# Patient Record
Sex: MALE | Race: WHITE | NOT HISPANIC OR LATINO | Employment: FULL TIME | URBAN - METROPOLITAN AREA
[De-identification: names, ages, dates, MRNs, and addresses within clinical notes are randomized per-mention and may not be internally consistent; named-entity substitution may affect disease eponyms.]

---

## 2023-03-13 PROBLEM — R53.83 FATIGUE: Status: ACTIVE | Noted: 2023-03-13

## 2023-03-13 PROBLEM — R00.2 HEART PALPITATIONS: Status: ACTIVE | Noted: 2023-03-13

## 2023-03-13 PROBLEM — M85.80 OSTEOPENIA: Status: ACTIVE | Noted: 2023-03-13

## 2023-03-13 PROBLEM — K42.9 UMBILICAL HERNIA WITHOUT OBSTRUCTION AND WITHOUT GANGRENE: Status: ACTIVE | Noted: 2023-03-13

## 2023-03-13 PROBLEM — Z90.49 S/P LAPAROSCOPIC APPENDECTOMY: Status: ACTIVE | Noted: 2023-03-13

## 2023-03-13 PROBLEM — R07.89 ATYPICAL CHEST PAIN: Status: ACTIVE | Noted: 2023-03-13

## 2023-03-13 PROBLEM — J32.9 CHRONIC SINUSITIS: Status: ACTIVE | Noted: 2023-03-13

## 2023-03-13 PROBLEM — R73.03 PREDIABETES: Status: ACTIVE | Noted: 2023-03-13

## 2023-03-13 PROBLEM — R35.0 URINARY FREQUENCY: Status: ACTIVE | Noted: 2023-03-13

## 2023-03-13 PROBLEM — M77.8 SHOULDER CAPSULITIS, RIGHT: Status: ACTIVE | Noted: 2023-03-13

## 2023-03-13 PROBLEM — E55.9 VITAMIN D DEFICIENCY: Status: ACTIVE | Noted: 2023-03-13

## 2023-03-13 PROBLEM — M54.12 CERVICAL NEURITIS: Status: ACTIVE | Noted: 2023-03-13

## 2023-03-13 PROBLEM — K63.5 COLON POLYPS: Status: ACTIVE | Noted: 2023-03-13

## 2023-03-13 PROBLEM — S43.001A SUBLUXATION OF SHOULDER JOINT, RIGHT, INITIAL ENCOUNTER: Status: ACTIVE | Noted: 2023-03-13

## 2023-03-13 PROBLEM — S29.019A THORACIC MYOFASCIAL STRAIN: Status: ACTIVE | Noted: 2023-03-13

## 2023-03-13 PROBLEM — R73.9 HYPERGLYCEMIA: Status: ACTIVE | Noted: 2023-03-13

## 2023-03-13 PROBLEM — K21.9 GASTROESOPHAGEAL REFLUX DISEASE: Status: ACTIVE | Noted: 2023-03-13

## 2023-03-13 PROBLEM — R42 DIZZINESS: Status: ACTIVE | Noted: 2023-03-13

## 2023-03-13 PROBLEM — M77.8 SHOULDER CAPSULITIS, LEFT: Status: ACTIVE | Noted: 2023-03-13

## 2023-03-13 PROBLEM — E83.51 HYPOCALCEMIA: Status: ACTIVE | Noted: 2023-03-13

## 2023-03-13 PROBLEM — R35.1 BENIGN PROSTATIC HYPERPLASIA WITH NOCTURIA: Status: ACTIVE | Noted: 2023-03-13

## 2023-03-13 PROBLEM — E78.5 HYPERLIPIDEMIA: Status: ACTIVE | Noted: 2023-03-13

## 2023-03-13 PROBLEM — I49.3 ASYMPTOMATIC PVCS: Status: ACTIVE | Noted: 2023-03-13

## 2023-03-13 PROBLEM — N40.1 BENIGN PROSTATIC HYPERPLASIA WITH NOCTURIA: Status: ACTIVE | Noted: 2023-03-13

## 2023-03-13 PROBLEM — R10.13 ABDOMINAL DISCOMFORT, EPIGASTRIC: Status: ACTIVE | Noted: 2023-03-13

## 2023-03-13 PROBLEM — R06.02 SHORTNESS OF BREATH: Status: ACTIVE | Noted: 2023-03-13

## 2023-03-13 RX ORDER — ERGOCALCIFEROL 1.25 MG/1
1.25 CAPSULE ORAL
COMMUNITY
End: 2023-03-14 | Stop reason: ALTCHOICE

## 2023-03-13 RX ORDER — TAMSULOSIN HYDROCHLORIDE 0.4 MG/1
0.4 CAPSULE ORAL DAILY
COMMUNITY
Start: 2021-10-05 | End: 2023-03-14

## 2023-03-15 ENCOUNTER — OFFICE VISIT (OUTPATIENT)
Dept: PRIMARY CARE | Facility: CLINIC | Age: 63
End: 2023-03-15
Payer: COMMERCIAL

## 2023-03-15 VITALS
HEART RATE: 79 BPM | OXYGEN SATURATION: 97 % | RESPIRATION RATE: 16 BRPM | WEIGHT: 193 LBS | TEMPERATURE: 98 F | BODY MASS INDEX: 27.02 KG/M2 | HEIGHT: 71 IN | SYSTOLIC BLOOD PRESSURE: 132 MMHG | DIASTOLIC BLOOD PRESSURE: 80 MMHG

## 2023-03-15 DIAGNOSIS — E11.9 TYPE 2 DIABETES MELLITUS WITHOUT COMPLICATION, UNSPECIFIED WHETHER LONG TERM INSULIN USE (MULTI): Primary | ICD-10-CM

## 2023-03-15 DIAGNOSIS — R73.9 HYPERGLYCEMIA: ICD-10-CM

## 2023-03-15 DIAGNOSIS — R42 DIZZINESS: ICD-10-CM

## 2023-03-15 DIAGNOSIS — E78.00 PURE HYPERCHOLESTEROLEMIA: ICD-10-CM

## 2023-03-15 LAB
ALANINE AMINOTRANSFERASE (SGPT) (U/L) IN SER/PLAS: 15 U/L (ref 10–52)
ALBUMIN (G/DL) IN SER/PLAS: 4.7 G/DL (ref 3.4–5)
ALKALINE PHOSPHATASE (U/L) IN SER/PLAS: 92 U/L (ref 33–136)
ANION GAP IN SER/PLAS: 13 MMOL/L (ref 10–20)
ASPARTATE AMINOTRANSFERASE (SGOT) (U/L) IN SER/PLAS: 24 U/L (ref 9–39)
BASOPHILS (10*3/UL) IN BLOOD BY AUTOMATED COUNT: 0.05 X10E9/L (ref 0–0.1)
BASOPHILS/100 LEUKOCYTES IN BLOOD BY AUTOMATED COUNT: 1 % (ref 0–2)
BILIRUBIN TOTAL (MG/DL) IN SER/PLAS: 0.5 MG/DL (ref 0–1.2)
CALCIUM (MG/DL) IN SER/PLAS: 9.5 MG/DL (ref 8.6–10.6)
CARBON DIOXIDE, TOTAL (MMOL/L) IN SER/PLAS: 26 MMOL/L (ref 21–32)
CHLORIDE (MMOL/L) IN SER/PLAS: 101 MMOL/L (ref 98–107)
CHOLESTEROL (MG/DL) IN SER/PLAS: 217 MG/DL (ref 0–199)
CHOLESTEROL IN HDL (MG/DL) IN SER/PLAS: 69 MG/DL
CHOLESTEROL/HDL RATIO: 3.1
CREATININE (MG/DL) IN SER/PLAS: 1 MG/DL (ref 0.5–1.3)
EOSINOPHILS (10*3/UL) IN BLOOD BY AUTOMATED COUNT: 0.1 X10E9/L (ref 0–0.7)
EOSINOPHILS/100 LEUKOCYTES IN BLOOD BY AUTOMATED COUNT: 2.1 % (ref 0–6)
ERYTHROCYTE DISTRIBUTION WIDTH (RATIO) BY AUTOMATED COUNT: 12 % (ref 11.5–14.5)
ERYTHROCYTE MEAN CORPUSCULAR HEMOGLOBIN CONCENTRATION (G/DL) BY AUTOMATED: 33.6 G/DL (ref 32–36)
ERYTHROCYTE MEAN CORPUSCULAR VOLUME (FL) BY AUTOMATED COUNT: 89 FL (ref 80–100)
ERYTHROCYTES (10*6/UL) IN BLOOD BY AUTOMATED COUNT: 5.14 X10E12/L (ref 4.5–5.9)
GFR MALE: 85 ML/MIN/1.73M2
GLUCOSE (MG/DL) IN SER/PLAS: 149 MG/DL (ref 74–99)
HBA1C MFR BLD: 6.3 % (ref 4.2–6.5)
HEMATOCRIT (%) IN BLOOD BY AUTOMATED COUNT: 45.6 % (ref 41–52)
HEMOGLOBIN (G/DL) IN BLOOD: 15.3 G/DL (ref 13.5–17.5)
IMMATURE GRANULOCYTES/100 LEUKOCYTES IN BLOOD BY AUTOMATED COUNT: 0.2 % (ref 0–0.9)
LDL: 122 MG/DL (ref 0–99)
LEUKOCYTES (10*3/UL) IN BLOOD BY AUTOMATED COUNT: 4.9 X10E9/L (ref 4.4–11.3)
LYMPHOCYTES (10*3/UL) IN BLOOD BY AUTOMATED COUNT: 1.67 X10E9/L (ref 1.2–4.8)
LYMPHOCYTES/100 LEUKOCYTES IN BLOOD BY AUTOMATED COUNT: 34.4 % (ref 13–44)
MONOCYTES (10*3/UL) IN BLOOD BY AUTOMATED COUNT: 0.45 X10E9/L (ref 0.1–1)
MONOCYTES/100 LEUKOCYTES IN BLOOD BY AUTOMATED COUNT: 9.3 % (ref 2–10)
NEUTROPHILS (10*3/UL) IN BLOOD BY AUTOMATED COUNT: 2.58 X10E9/L (ref 1.2–7.7)
NEUTROPHILS/100 LEUKOCYTES IN BLOOD BY AUTOMATED COUNT: 53 % (ref 40–80)
NRBC (PER 100 WBCS) BY AUTOMATED COUNT: 0 /100 WBC (ref 0–0)
PLATELETS (10*3/UL) IN BLOOD AUTOMATED COUNT: 183 X10E9/L (ref 150–450)
POC FINGERSTICK BLOOD GLUCOSE: 157 MG/DL (ref 70–100)
POTASSIUM (MMOL/L) IN SER/PLAS: 4.4 MMOL/L (ref 3.5–5.3)
PROTEIN TOTAL: 7.6 G/DL (ref 6.4–8.2)
SODIUM (MMOL/L) IN SER/PLAS: 136 MMOL/L (ref 136–145)
TRIGLYCERIDE (MG/DL) IN SER/PLAS: 128 MG/DL (ref 0–149)
UREA NITROGEN (MG/DL) IN SER/PLAS: 13 MG/DL (ref 6–23)
VLDL: 26 MG/DL (ref 0–40)

## 2023-03-15 PROCEDURE — 3079F DIAST BP 80-89 MM HG: CPT | Performed by: FAMILY MEDICINE

## 2023-03-15 PROCEDURE — 80061 LIPID PANEL: CPT

## 2023-03-15 PROCEDURE — 3044F HG A1C LEVEL LT 7.0%: CPT | Performed by: FAMILY MEDICINE

## 2023-03-15 PROCEDURE — 3075F SYST BP GE 130 - 139MM HG: CPT | Performed by: FAMILY MEDICINE

## 2023-03-15 PROCEDURE — 99214 OFFICE O/P EST MOD 30 MIN: CPT | Performed by: FAMILY MEDICINE

## 2023-03-15 PROCEDURE — 85025 COMPLETE CBC W/AUTO DIFF WBC: CPT

## 2023-03-15 PROCEDURE — 36415 COLL VENOUS BLD VENIPUNCTURE: CPT | Performed by: FAMILY MEDICINE

## 2023-03-15 PROCEDURE — 1036F TOBACCO NON-USER: CPT | Performed by: FAMILY MEDICINE

## 2023-03-15 PROCEDURE — 80053 COMPREHEN METABOLIC PANEL: CPT

## 2023-03-15 PROCEDURE — 82962 GLUCOSE BLOOD TEST: CPT | Performed by: FAMILY MEDICINE

## 2023-03-15 PROCEDURE — 83036 HEMOGLOBIN GLYCOSYLATED A1C: CPT | Performed by: FAMILY MEDICINE

## 2023-03-15 ASSESSMENT — PAIN SCALES - GENERAL: PAINLEVEL: 0-NO PAIN

## 2023-03-15 NOTE — PROGRESS NOTES
Subjective   Jacobo Dietz is a 62 y.o. male who presents for Follow-up.  HPI patient is a 62-year-old diabetic male who is in for recheck on his blood sugar and A1c.  He also needs his lipids rechecked and his liver enzymes. patient's vitals are stable and he does try to watch his weight and what he eats.  He does travel sometimes with his job and he does have a lot of stress.  He does worry about his heart and would be a good candidate for coronary CT scan.  We did discuss this and I will order it so he can have it done as an outpatient.  Patient states he has quit drinking pop but tries to avoid simple sugars.  Very seldom has time to exercise but he does do a lot of walking.      Objective ROS; 10 systems were reviewed  Significant findings are noted in the HPI above.  Patient is borderline diabetic, he also has dyslipidemia, atypical chest pain and stress with his.    Physical Exam  Vitals and nursing note reviewed.   Constitutional:       General: He is not in acute distress.     Appearance: Normal appearance. He is obese. He is not ill-appearing.   HENT:      Head: Normocephalic.      Right Ear: Tympanic membrane and external ear normal.      Left Ear: Tympanic membrane and external ear normal.      Nose: Nose normal.      Mouth/Throat:      Mouth: Mucous membranes are moist.      Pharynx: Oropharynx is clear.   Eyes:      Extraocular Movements: Extraocular movements intact.      Conjunctiva/sclera: Conjunctivae normal.      Pupils: Pupils are equal, round, and reactive to light.   Neck:      Comments: Occasional neck spasm from stress.  Mild restriction of motion.  Cardiovascular:      Rate and Rhythm: Normal rate and regular rhythm.      Pulses: Normal pulses.      Heart sounds: Normal heart sounds.      Comments: Heart rhythm is stable S1 and S2 noted.  Pulmonary:      Effort: Pulmonary effort is normal. No respiratory distress.      Breath sounds: Normal breath sounds. No wheezing or rhonchi.       Comments: Lungs are clear to auscultation.  Abdominal:      General: Abdomen is flat. Bowel sounds are normal.      Palpations: Abdomen is soft.      Comments: Soft and nontender.  No abdominal masses.   Genitourinary:     Comments: Not done,,,,,,,,,,,,,, denies any dysuria and no hematuria.  Musculoskeletal:         General: Normal range of motion.      Cervical back: Normal range of motion and neck supple.      Comments:   Because I am still do notPatient has restricted range of motion lumbar spine due to spasm and degenerative arthritis.  Currently stable and patient does do stretching exercises at home.   Skin:     General: Skin is warm.   Neurological:      General: No focal deficit present.      Mental Status: He is alert and oriented to person, place, and time. Mental status is at baseline.   Psychiatric:         Behavior: Behavior normal.         Thought Content: Thought content normal.         Judgment: Judgment normal.      Comments: Patient has a normal mood and affect.  He does worry about the strong family history of cancer.     ;      Assessment/Plan patient was evaluated for his diabetes recheck on lipids and blood counts.  He also will have his liver enzymes checked.  His blood sugar was stable at 157 and the A1c was stable at 6.3%.  He does try to watch his diet closely.  Really has no other health concerns and he will be due for a colonoscopy in 3 years.  He had his last one last year.  Patient worries about diabetes which is in his family and also cancer which is strong in his family.  Patient will be notified of all his blood results and further recommendations will be made at that time.  He will follow-up in 6 months or sooner as needed.  Problem List Items Addressed This Visit          Endocrine/Metabolic    Type 2 diabetes mellitus without complication (CMS/HCC) - Primary    Relevant Orders    POCT fingerstick glucose manually resulted    POCT Glycosylated Hemoglobin (HGB A1C) docked device     Comprehensive Metabolic Panel    POCT fingerstick glucose manually resulted (Completed)    POCT Glycosylated Hemoglobin (HGB A1C) docked device       Other    Dizziness    Relevant Orders    CBC and Auto Differential    Hyperglycemia    Hyperlipidemia    Relevant Orders    Lipid Panel            Julio C Granados, DO

## 2023-06-02 ENCOUNTER — TELEPHONE (OUTPATIENT)
Dept: PRIMARY CARE | Facility: CLINIC | Age: 63
End: 2023-06-02
Payer: COMMERCIAL

## 2023-06-02 NOTE — TELEPHONE ENCOUNTER
----- Message from Julio C Granados DO sent at 6/2/2023  1:14 PM EDT -----  The coronary calcium score was 55.8         which is good          under 100       and closer to 0 is the best       the heart looks stable        and the aorta and blood vessels looks stable      the lungs and middle of the chest will stable       overall the calcium test is very good       and we can review it next time he comes in .  He should not have to worry

## 2024-05-22 ENCOUNTER — APPOINTMENT (OUTPATIENT)
Dept: PRIMARY CARE | Facility: CLINIC | Age: 64
End: 2024-05-22
Payer: COMMERCIAL

## 2024-05-22 DIAGNOSIS — E55.9 VITAMIN D DEFICIENCY: ICD-10-CM

## 2024-05-22 DIAGNOSIS — Z00.00 PHYSICAL EXAM, ANNUAL: ICD-10-CM

## 2024-05-23 ENCOUNTER — OFFICE VISIT (OUTPATIENT)
Dept: PRIMARY CARE | Facility: CLINIC | Age: 64
End: 2024-05-23
Payer: COMMERCIAL

## 2024-05-23 VITALS
WEIGHT: 189 LBS | BODY MASS INDEX: 26.46 KG/M2 | HEIGHT: 71 IN | RESPIRATION RATE: 18 BRPM | TEMPERATURE: 98.2 F | SYSTOLIC BLOOD PRESSURE: 140 MMHG | HEART RATE: 94 BPM | OXYGEN SATURATION: 96 % | DIASTOLIC BLOOD PRESSURE: 80 MMHG

## 2024-05-23 DIAGNOSIS — E55.9 VITAMIN D DEFICIENCY: ICD-10-CM

## 2024-05-23 DIAGNOSIS — E11.9 TYPE 2 DIABETES MELLITUS WITHOUT COMPLICATION, UNSPECIFIED WHETHER LONG TERM INSULIN USE (MULTI): ICD-10-CM

## 2024-05-23 DIAGNOSIS — E78.00 PURE HYPERCHOLESTEROLEMIA: ICD-10-CM

## 2024-05-23 DIAGNOSIS — Z00.00 PHYSICAL EXAM, ANNUAL: Primary | ICD-10-CM

## 2024-05-23 DIAGNOSIS — R73.03 PREDIABETES: ICD-10-CM

## 2024-05-23 LAB
25(OH)D3 SERPL-MCNC: 21 NG/ML (ref 30–100)
ALBUMIN SERPL BCP-MCNC: 5 G/DL (ref 3.4–5)
ALP SERPL-CCNC: 90 U/L (ref 33–136)
ALT SERPL W P-5'-P-CCNC: 16 U/L (ref 10–52)
ANION GAP SERPL CALC-SCNC: 15 MMOL/L (ref 10–20)
APPEARANCE UR: CLEAR
AST SERPL W P-5'-P-CCNC: 25 U/L (ref 9–39)
BILIRUB SERPL-MCNC: 0.9 MG/DL (ref 0–1.2)
BILIRUB UR QL STRIP: NEGATIVE
BUN SERPL-MCNC: 12 MG/DL (ref 6–23)
CALCIUM SERPL-MCNC: 9.5 MG/DL (ref 8.6–10.6)
CHLORIDE SERPL-SCNC: 100 MMOL/L (ref 98–107)
CHOLEST SERPL-MCNC: 219 MG/DL (ref 0–199)
CHOLESTEROL/HDL RATIO: 3.2
CO2 SERPL-SCNC: 27 MMOL/L (ref 21–32)
COLOR UR: YELLOW
CREAT SERPL-MCNC: 0.98 MG/DL (ref 0.5–1.3)
EGFRCR SERPLBLD CKD-EPI 2021: 87 ML/MIN/1.73M*2
ERYTHROCYTE [DISTWIDTH] IN BLOOD BY AUTOMATED COUNT: 12.3 % (ref 11.5–14.5)
GLUCOSE SERPL-MCNC: 116 MG/DL (ref 74–99)
GLUCOSE UR STRIP-MCNC: NEGATIVE MG/DL
HBA1C MFR BLD: 6.4 % (ref 4.2–6.5)
HCT VFR BLD AUTO: 48 % (ref 41–52)
HDLC SERPL-MCNC: 68.5 MG/DL
HGB BLD-MCNC: 16.1 G/DL (ref 13.5–17.5)
HGB UR QL STRIP: NEGATIVE
KETONES UR STRIP-MCNC: NEGATIVE MG/DL
LDLC SERPL CALC-MCNC: 114 MG/DL
LEUKOCYTE ESTERASE UR QL STRIP: NEGATIVE
MCH RBC QN AUTO: 30.3 PG (ref 26–34)
MCHC RBC AUTO-ENTMCNC: 33.5 G/DL (ref 32–36)
MCV RBC AUTO: 90 FL (ref 80–100)
NITRITE UR QL STRIP: NEGATIVE
NON HDL CHOLESTEROL: 151 MG/DL (ref 0–149)
NRBC BLD-RTO: 0 /100 WBCS (ref 0–0)
PH UR STRIP: 5.5 [PH]
PLATELET # BLD AUTO: 266 X10*3/UL (ref 150–450)
POC FINGERSTICK BLOOD GLUCOSE: 124 MG/DL (ref 70–100)
POTASSIUM SERPL-SCNC: 4.4 MMOL/L (ref 3.5–5.3)
PROT SERPL-MCNC: 8.2 G/DL (ref 6.4–8.2)
PROT UR STRIP-MCNC: NEGATIVE MG/DL
PSA SERPL-MCNC: 2.77 NG/ML
RBC # BLD AUTO: 5.31 X10*6/UL (ref 4.5–5.9)
SODIUM SERPL-SCNC: 138 MMOL/L (ref 136–145)
SP GR UR STRIP.AUTO: <=1.005
TRIGL SERPL-MCNC: 181 MG/DL (ref 0–149)
TSH SERPL-ACNC: 1.23 MIU/L (ref 0.44–3.98)
UROBILINOGEN UR STRIP-ACNC: 0.2 E.U./DL
VLDL: 36 MG/DL (ref 0–40)
WBC # BLD AUTO: 5.6 X10*3/UL (ref 4.4–11.3)

## 2024-05-23 PROCEDURE — 3077F SYST BP >= 140 MM HG: CPT | Performed by: FAMILY MEDICINE

## 2024-05-23 PROCEDURE — 81003 URINALYSIS AUTO W/O SCOPE: CPT | Performed by: FAMILY MEDICINE

## 2024-05-23 PROCEDURE — 84443 ASSAY THYROID STIM HORMONE: CPT

## 2024-05-23 PROCEDURE — 84153 ASSAY OF PSA TOTAL: CPT

## 2024-05-23 PROCEDURE — 3079F DIAST BP 80-89 MM HG: CPT | Performed by: FAMILY MEDICINE

## 2024-05-23 PROCEDURE — 1036F TOBACCO NON-USER: CPT | Performed by: FAMILY MEDICINE

## 2024-05-23 PROCEDURE — 82962 GLUCOSE BLOOD TEST: CPT | Performed by: FAMILY MEDICINE

## 2024-05-23 PROCEDURE — 83036 HEMOGLOBIN GLYCOSYLATED A1C: CPT | Mod: CLIA WAIVED TEST | Performed by: FAMILY MEDICINE

## 2024-05-23 PROCEDURE — 80061 LIPID PANEL: CPT

## 2024-05-23 PROCEDURE — 99396 PREV VISIT EST AGE 40-64: CPT | Performed by: FAMILY MEDICINE

## 2024-05-23 PROCEDURE — 80053 COMPREHEN METABOLIC PANEL: CPT

## 2024-05-23 PROCEDURE — 93000 ELECTROCARDIOGRAM COMPLETE: CPT | Performed by: FAMILY MEDICINE

## 2024-05-23 PROCEDURE — 36415 COLL VENOUS BLD VENIPUNCTURE: CPT

## 2024-05-23 PROCEDURE — 82306 VITAMIN D 25 HYDROXY: CPT

## 2024-05-23 PROCEDURE — 3044F HG A1C LEVEL LT 7.0%: CPT | Performed by: FAMILY MEDICINE

## 2024-05-23 PROCEDURE — 85027 COMPLETE CBC AUTOMATED: CPT

## 2024-05-23 ASSESSMENT — PATIENT HEALTH QUESTIONNAIRE - PHQ9
1. LITTLE INTEREST OR PLEASURE IN DOING THINGS: NOT AT ALL
2. FEELING DOWN, DEPRESSED OR HOPELESS: NOT AT ALL
SUM OF ALL RESPONSES TO PHQ9 QUESTIONS 1 AND 2: 0

## 2024-05-23 ASSESSMENT — PAIN SCALES - GENERAL: PAINLEVEL: 0-NO PAIN

## 2024-05-23 NOTE — PROGRESS NOTES
Subjective   Jacobo Dietz is a 63 y.o. male who presents for Annual Exam (Patient here for annual physical exam today).    HPI : Patient is a 63-year-old diabetic male who is in for his yearly physical exam.  Patient has diet-controlled diabetes and does try to watch what he eats and his A1c has been under 6.5 in the past.  He is in today for his yearly physical and he will have a urinalysis checked, ECG and complete blood work.  He has been traveling quite a bit with work but now his big project in [place] has completed and he will be in the [facility] for a longer period of time.  He states he feels well and denies any chest pain or shortness of breath.  He does try to stay very active working in the yard.    Objective  : ROS : 10 systems were reviewed and the information is included in the HPI and no additional review of systems is indicated.    Physical Exam  Vitals and nursing note reviewed.   Constitutional:       Appearance: Normal appearance. He is normal weight.      Comments: Patient is alert and oriented x3.   No acute distress   HENT:      Head: Normocephalic.      Right Ear: Tympanic membrane and external ear normal.      Left Ear: Tympanic membrane and external ear normal.      Ears:      Comments: Ears are patent bilaterally and TMs are clear.     Nose: Nose normal.      Mouth/Throat:      Mouth: Mucous membranes are moist.      Pharynx: Oropharynx is clear.      Comments: Mouth is moist, tongue is midline.  No posterior pharyngeal erythema.  Eyes:      Extraocular Movements: Extraocular movements intact.      Conjunctiva/sclera: Conjunctivae normal.      Pupils: Pupils are equal, round, and reactive to light.      Comments: No visual disturbance, does have her eyes examined once a year.   Neck:      Comments: Mild restriction of motion cervical spine due to mild arthritis and secondary muscle spasm.  No carotid bruits, no thyromegaly, no cervical adenopathy.    Cardiovascular:      Rate and  Rhythm: Normal rate and regular rhythm.      Pulses: Normal pulses.      Heart sounds: Normal heart sounds.      Comments: Patient denies chest pain and no palpitations.  Heart rhythm is stable S1 and S2 are noted, no ectopics.  Pulmonary:      Effort: Pulmonary effort is normal.      Breath sounds: Normal breath sounds.      Comments: Patient denies any coughing or wheezing.  Lungs are clear to auscultation.    Abdominal:      General: Bowel sounds are normal.      Palpations: Abdomen is soft.      Comments: Patient states he was working in the yard and does have a little bit of upper abdominal discomfort from bending and lifting.  No palpable tenderness on examination.   No signs of diastases recti and no abdominal hernia noted.  Patient will be due for his next colonoscopy September 2025.  Patient does worry about abdominal problems since his brother had esophageal cancer and passed away 5 years ago.   Genitourinary:     Comments: Patient denies dysuria, no hematuria, no nocturia, denies flank pain.  Musculoskeletal:         General: Normal range of motion.      Cervical back: Normal range of motion.      Comments: Patient denies any significant musculoskeletal problems but just has age-related arthritis in the joints.  Mild restriction of motion cervical and lumbar spines due to muscle spasm.  Patient has had previous shoulder strains of both shoulders but that is currently stable.   Skin:     General: Skin is warm.      Comments: There is no bruising, no erythema, no skin lesions noted, no rashes.   Neurological:      General: No focal deficit present.      Mental Status: He is alert and oriented to person, place, and time. Mental status is at baseline.      Comments: No focal neurosensory deficits are noted.  Patient denies any peripheral neuropathy.  Coordination and gait are stable.  Normal muscle strength upper and lower extremities.   Psychiatric:         Mood and Affect: Mood normal.         Behavior:  Behavior normal.         Thought Content: Thought content normal.         Judgment: Judgment normal.      Comments: Patient has normal mood and affect.  Thought content and judgment are stable.  No signs of vascular dementia.  Behavior is normal.     PLAN   patient is a 63-year-old male who was evaluated today for a yearly physical exam.  His urinalysis showed a pH of 5.5, specific gravity 1.005, negative leukocytes, negative protein, negative blood, negative glucose.  He is diabetic and his blood sugar was 124 and his A1c was stable at 6.4%.  ECG showed sinus rhythm at a rate of 73 bpm, no acute ST-T wave changes, normal ECG.  Patient will be notified of all his blood results when available in 3 days and further recommendations will be made at that time.  He is not due for repeat colonoscopy until next September 2025.  Patient had a coronary CT scan last year and we did review the results.  Overall he is very stable and will follow-up as needed pending the blood work results.  He was encouraged to continue watching his diet so he does not have to go on medications for his diabetes.                Problem List Items Addressed This Visit       Vitamin D deficiency    Relevant Orders    Vitamin D 25-Hydroxy,Total (for eval of Vitamin D levels)    Type 2 diabetes mellitus without complication (Multi)     Other Visit Diagnoses       Physical exam, annual        Relevant Orders    CBC    Comprehensive Metabolic Panel    Lipid Panel    POCT fingerstick glucose manually resulted    POCT Glycosylated Hemoglobin (HGB A1C) docked device    Prostate Specific Antigen, Screen    Thyroid Stimulating Hormone    POCT UA (Automated) docked device    ECG 12 Lead                 Julio C Granados DO

## 2024-05-24 DIAGNOSIS — E55.9 VITAMIN D DEFICIENCY: Primary | ICD-10-CM

## 2024-05-24 RX ORDER — ERGOCALCIFEROL 1.25 MG/1
50000 CAPSULE ORAL
Qty: 4 CAPSULE | Refills: 1 | Status: SHIPPED | OUTPATIENT
Start: 2024-05-26 | End: 2024-07-21

## 2024-05-24 NOTE — RESULT ENCOUNTER NOTE
Kidney and liver function are normal    cholesterol is just slightly borderline at 219      triglycerides are borderline at 181        should be under 150       just watch the fats in the diet  Vitamin D is low at 21 and should be above 30       I will send in some prescription vitamin D     for him to take once per week   he should also take over-the-counter vitamin D daily           Red and white blood cell counts are normal    thyroid function is normal         Prostate level is normal at 2.77    under 4 is normal    diabetes is stable         just watch the fats and cholesterol in the diet        and raise the vitamin D

## 2024-07-01 ENCOUNTER — APPOINTMENT (OUTPATIENT)
Dept: PRIMARY CARE | Facility: CLINIC | Age: 64
End: 2024-07-01
Payer: COMMERCIAL

## 2024-07-01 VITALS
HEART RATE: 64 BPM | WEIGHT: 188 LBS | TEMPERATURE: 98.1 F | BODY MASS INDEX: 26.32 KG/M2 | RESPIRATION RATE: 18 BRPM | SYSTOLIC BLOOD PRESSURE: 145 MMHG | HEIGHT: 71 IN | DIASTOLIC BLOOD PRESSURE: 84 MMHG | OXYGEN SATURATION: 96 %

## 2024-07-01 DIAGNOSIS — K29.00 OTHER ACUTE GASTRITIS WITHOUT HEMORRHAGE: Primary | ICD-10-CM

## 2024-07-01 DIAGNOSIS — Z90.49 S/P LAPAROSCOPIC APPENDECTOMY: ICD-10-CM

## 2024-07-01 PROCEDURE — 3049F LDL-C 100-129 MG/DL: CPT | Performed by: FAMILY MEDICINE

## 2024-07-01 PROCEDURE — 3077F SYST BP >= 140 MM HG: CPT | Performed by: FAMILY MEDICINE

## 2024-07-01 PROCEDURE — 3079F DIAST BP 80-89 MM HG: CPT | Performed by: FAMILY MEDICINE

## 2024-07-01 PROCEDURE — 99214 OFFICE O/P EST MOD 30 MIN: CPT | Performed by: FAMILY MEDICINE

## 2024-07-01 PROCEDURE — 3044F HG A1C LEVEL LT 7.0%: CPT | Performed by: FAMILY MEDICINE

## 2024-07-01 PROCEDURE — 1036F TOBACCO NON-USER: CPT | Performed by: FAMILY MEDICINE

## 2024-07-01 RX ORDER — VONOPRAZAN FUMARATE 26.72 MG/1
20 TABLET ORAL DAILY
Qty: 10 TABLET | Refills: 0 | Status: SHIPPED | COMMUNITY
Start: 2024-07-01 | End: 2024-07-02 | Stop reason: SDUPTHER

## 2024-07-01 ASSESSMENT — PATIENT HEALTH QUESTIONNAIRE - PHQ9
2. FEELING DOWN, DEPRESSED OR HOPELESS: NOT AT ALL
2. FEELING DOWN, DEPRESSED OR HOPELESS: NOT AT ALL
1. LITTLE INTEREST OR PLEASURE IN DOING THINGS: NOT AT ALL
1. LITTLE INTEREST OR PLEASURE IN DOING THINGS: NOT AT ALL
SUM OF ALL RESPONSES TO PHQ9 QUESTIONS 1 AND 2: 0
SUM OF ALL RESPONSES TO PHQ9 QUESTIONS 1 AND 2: 0

## 2024-07-01 ASSESSMENT — PAIN SCALES - GENERAL: PAINLEVEL: 0-NO PAIN

## 2024-07-01 NOTE — PROGRESS NOTES
Subjective   Jacobo Dietz is a 64 y.o. male who presents for Sick Visit (Patient here with complaint of stomach pain).    HPI :  Patient is a 64-year-old who was evaluated in the urgent care center [facility] for abdominal pain and acute gastritis.  He is in today for follow-up and referral to gastroenterology.  He states the medication he was given did seem to help but he does get some epigastric discomfort episodically.  He does have a lot of work stress and was worried about a possible ulcer.  He does not really have any abdominal pain but more of an indigestion and heartburn in the epigastric region.  He states he did quit drinking beer and has not drank any alcohol in the past 3 or 4 weeks.  He also was taking Pepcid and trying to watch his diet.  He is in today accompanied by his wife for reevaluation.    Objective  : ROS : 10 systems were reviewed and the information is included in the HPI and no additional review of systems is indicated.    Physical Exam  Vitals and nursing note reviewed.   Constitutional:       Appearance: Normal appearance.      Comments: Patient is alert and oriented x3.   No acute distress   HENT:      Head: Normocephalic.      Right Ear: Tympanic membrane and external ear normal.      Left Ear: Tympanic membrane and external ear normal.      Ears:      Comments: Ears are patent bilaterally and TMs are clear.     Nose: Nose normal.      Mouth/Throat:      Mouth: Mucous membranes are moist.      Pharynx: Oropharynx is clear.      Comments: Mouth is moist, tongue is midline.  No posterior pharyngeal erythema.  Eyes:      Extraocular Movements: Extraocular movements intact.      Conjunctiva/sclera: Conjunctivae normal.      Pupils: Pupils are equal, round, and reactive to light.      Comments: No visual disturbance, does have his eyes examined once a year.   Neck:      Comments: No carotid bruits, no thyromegaly, no cervical adenopathy.  Occasional neck spasm and restriction of motion  secondary to stress and tension.  Cardiovascular:      Rate and Rhythm: Normal rate and regular rhythm.      Pulses: Normal pulses.      Heart sounds: Normal heart sounds.      Comments: Patient denies chest pain and no palpitations.  Heart rhythm is stable S1 and S2 are noted, no ectopics.  Pulmonary:      Effort: Pulmonary effort is normal.      Breath sounds: Normal breath sounds.      Comments: Patient denies any coughing or wheezing.  Lungs are clear to auscultation.    Abdominal:      General: Bowel sounds are normal.      Palpations: Abdomen is soft.      Comments: Patient's abdomen is soft and essentially nontender to palpation.  He states when he gets the epigastric pain is between the epigastrium and the upper abdomen.  He has no flank tenderness, no abdominal masses were noted.  No rebound tenderness and no guarding.  He will be referred to gastroenterology for an upper endoscopy.  He also will try some Voquenza 20 mg daily for gastritis.   Genitourinary:     Comments: Patient denies dysuria, no hematuria, no nocturia, denies flank pain.  Musculoskeletal:         General: Normal range of motion.      Cervical back: Normal range of motion and neck supple.      Comments: Age-related arthritis in the joints.  Mild restriction of motion cervical and lumbar spines due to muscle spasm.   Skin:     General: Skin is warm.      Comments: There is no bruising, no erythema, no skin lesions noted, no rashes.   Neurological:      General: No focal deficit present.      Mental Status: He is alert and oriented to person, place, and time. Mental status is at baseline.      Comments: No focal neurosensory deficits are noted.  Patient denies any peripheral neuropathy.  Coordination and gait are stable.  Normal muscle strength upper and lower extremities.   Psychiatric:         Mood and Affect: Mood normal.         Behavior: Behavior normal.         Thought Content: Thought content normal.         Judgment: Judgment normal.       Comments: Patient has normal mood and affect but he does have a lot of stress at work and could be developing a stress ulcer.  Thought content and judgment are stable.  No signs of vascular dementia.  Behavior is normal.     PLAN : Patient is a 64-year-old male who was evaluated today in follow-up after he presented to an urgent care center last month.  He was treated for gastritis at that time with Pepcid and an antibiotic.  He states he is feeling better but occasionally has some epigastric discomfort that is currently non tender to palpation.  No rebound tenderness ,  no flank pain and no guarding.  He will be referred to gastroenterology and he will try watch his diet, and  in the meantime  will also be started on   Voquenza 20 mg daily for 10 days.  Patient otherwise seems very stable and will follow-up if there is no improvement or after he sees gastroenterology.  He wanted to go to the gastroenterologist that did his  colonoscopy last year.  He was given a printed referral.  I also reviewed his blood results from his physical exam on May 23.  At that time everything seems very stable.    Problem List Items Addressed This Visit    None           Julio C Granados, DO

## 2024-07-02 DIAGNOSIS — K29.00 OTHER ACUTE GASTRITIS WITHOUT HEMORRHAGE: ICD-10-CM

## 2024-07-03 RX ORDER — VONOPRAZAN FUMARATE 26.72 MG/1
20 TABLET ORAL DAILY
Qty: 10 TABLET | Refills: 0 | COMMUNITY
Start: 2024-07-03 | End: 2024-07-13

## 2025-02-18 ENCOUNTER — APPOINTMENT (OUTPATIENT)
Dept: PRIMARY CARE | Facility: CLINIC | Age: 65
End: 2025-02-18
Payer: COMMERCIAL

## 2025-02-18 VITALS
SYSTOLIC BLOOD PRESSURE: 142 MMHG | RESPIRATION RATE: 18 BRPM | BODY MASS INDEX: 27.16 KG/M2 | WEIGHT: 194 LBS | HEIGHT: 71 IN | DIASTOLIC BLOOD PRESSURE: 82 MMHG | OXYGEN SATURATION: 96 % | TEMPERATURE: 97.9 F | HEART RATE: 85 BPM

## 2025-02-18 DIAGNOSIS — I10 PRIMARY HYPERTENSION: ICD-10-CM

## 2025-02-18 DIAGNOSIS — E78.2 MIXED HYPERLIPIDEMIA: ICD-10-CM

## 2025-02-18 DIAGNOSIS — S39.012A ACUTE MYOFASCIAL STRAIN OF LUMBOSACRAL REGION, INITIAL ENCOUNTER: ICD-10-CM

## 2025-02-18 DIAGNOSIS — E55.9 VITAMIN D DEFICIENCY: ICD-10-CM
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
## 2025-02-18 PROCEDURE — 3008F BODY MASS INDEX DOCD: CPT | Performed by: FAMILY MEDICINE

## 2025-02-18 PROCEDURE — 3077F SYST BP >= 140 MM HG: CPT | Performed by: FAMILY MEDICINE

## 2025-02-18 PROCEDURE — 3079F DIAST BP 80-89 MM HG: CPT | Performed by: FAMILY MEDICINE

## 2025-02-18 PROCEDURE — 1036F TOBACCO NON-USER: CPT | Performed by: FAMILY MEDICINE

## 2025-02-18 PROCEDURE — 99214 OFFICE O/P EST MOD 30 MIN: CPT | Performed by: FAMILY MEDICINE

## 2025-02-18 RX ORDER — METHOCARBAMOL 500 MG/1
500 TABLET, FILM COATED ORAL 3 TIMES DAILY PRN
Qty: 40 TABLET | Refills: 0 | Status: SHIPPED | OUTPATIENT
Start: 2025-02-18 | End: 2025-03-20

## 2025-02-18 RX ORDER — PREDNISONE 10 MG/1
10 TABLET ORAL 2 TIMES DAILY
Qty: 10 TABLET | Refills: 0 | Status: SHIPPED | OUTPATIENT
Start: 2025-02-18 | End: 2025-02-23

## 2025-02-18 ASSESSMENT — PATIENT HEALTH QUESTIONNAIRE - PHQ9
2. FEELING DOWN, DEPRESSED OR HOPELESS: NOT AT ALL
1. LITTLE INTEREST OR PLEASURE IN DOING THINGS: NOT AT ALL
SUM OF ALL RESPONSES TO PHQ9 QUESTIONS 1 AND 2: 0

## 2025-02-18 ASSESSMENT — PAIN SCALES - GENERAL: PAINLEVEL_OUTOF10: 0-NO PAIN

## 2025-02-18 NOTE — PROGRESS NOTES
Subjective   Jacobo Dietz is a 64 y.o. male who presents for Sick Visit (Patient here with complains of pain on right side, started in back, moved to side).    HPI  : Patient is a 64-year-old relatively healthy male who states he had fallen on the ice 2 weeks ago and still has some pain on the right flank region.  He states it is more in the right lower lumbar region and around the right flank.  His urination has been normal he has not seen any blood in his urine.  His lower rib cage feels normal and when he takes ibuprofen it seems to help but then when the ibuprofen wears off the pain reoccurs.  He has no trouble sleeping and has been going to work every day.     Objective  : ROS :10 systems were reviewed and the information is included in the HPI and no additional review of systems is indicated.    Physical Exam  Vitals and nursing note reviewed.   Constitutional:       Appearance: Normal appearance. He is normal weight.      Comments: Patient is alert and oriented x3.   No acute distress   HENT:      Head: Normocephalic.      Right Ear: Tympanic membrane and external ear normal.      Left Ear: Tympanic membrane and external ear normal.      Ears:      Comments: Ears are patent bilaterally and TMs are clear.     Nose: Nose normal.      Mouth/Throat:      Mouth: Mucous membranes are moist.      Pharynx: Oropharynx is clear.      Comments: Mouth is moist, tongue is midline.  No posterior pharyngeal erythema.  Eyes:      Extraocular Movements: Extraocular movements intact.      Conjunctiva/sclera: Conjunctivae normal.      Pupils: Pupils are equal, round, and reactive to light.      Comments: No visual disturbance, does have his eyes examined once a year.   Neck:      Comments: No carotid bruits, no thyromegaly, no cervical adenopathy.  Occasional neck spasm and restriction of motion secondary to stress and tension.  Cardiovascular:      Rate and Rhythm: Normal rate and regular rhythm.      Pulses: Normal pulses.       Heart sounds: Normal heart sounds.      Comments: Patient denies chest pain and no palpitations.  Heart rhythm is stable S1 and S2 are noted, no ectopics.  Pulmonary:      Effort: Pulmonary effort is normal.      Breath sounds: Normal breath sounds.      Comments: Patient denies any coughing or wheezing.  Lungs are clear to auscultation.  Patient denies any right lower rib pain.  His discomfort is below the rib cage towards his right flank and right lumbar region.   Abdominal:      General: Bowel sounds are normal.      Palpations: Abdomen is soft.      Comments: Abdomen is soft and non tender.   Pain is towards the right flank to the right lumbar region..  No suprapubic pain.  Patient denies any bowel problems.  No abdominal guarding and no rebound tenderness.   Genitourinary:     Comments: Patient fell on the ice and kameron his right lower back but does not have any blood in his urine and states his urination and bowel movements have been normal.  Musculoskeletal:         General: Tenderness present. Normal range of motion.      Cervical back: Normal range of motion and neck supple.      Comments: Patient has some tenderness in the right lower lumbar region and right flank area.  Straight leg raising is normal and patient can sit up without any discomfort or problems.  There is no significant intra-abdominal tenderness and no lower rib tenderness.  It seems to be right lateral paraspinal tenderness.   Skin:     General: Skin is warm.      Comments: There is no bruising, no erythema, no skin lesions noted, no rashes.   Neurological:      General: No focal deficit present.      Mental Status: He is alert and oriented to person, place, and time. Mental status is at baseline.      Sensory: Sensory deficit present.      Comments: No paresthesias in the lower extremities and no sciatica.  Straight leg raising is bilaterally normal to 90 degrees.  No focal neurosensory deficits are noted.  Patient denies any  peripheral neuropathy.  Coordination and gait are stable.  Normal muscle strength upper and lower extremities.   Psychiatric:         Mood and Affect: Mood normal.         Behavior: Behavior normal.         Thought Content: Thought content normal.         Judgment: Judgment normal.      Comments: Patient has normal mood and affect.  Thought content and judgment are stable.  No signs of vascular dementia.  Behavior is normal.     PLAN : Patient is a relatively healthy 64-year-old male who was evaluated today for some right flank and right lumbar back pain since he slipped on the ice 2 weeks ago and kameron his lower back.  He has been taking some ibuprofen but does not want continue on a daily basis.  He does not really want any x-rays at this time and would rather try a muscle relaxant and some tapering prednisone before having it evaluated by x-ray or CT scan.  He has no abdominal pain in the anterior region and right lower quadrant is soft and nontender.  Straight leg raising is normal.  He will try prednisone and a muscle relaxer and apply some warm compresses.  He will give it a week or 2 and let me know if it is not improving then we will order further x-ray evaluation.  Patient was agreeable with this approach and will let me know in   7  to   14 days.    Problem List Items Addressed This Visit       Hyperlipidemia    Vitamin D deficiency     Other Visit Diagnoses       Diabetes mellitus without complication (Multi)        Primary hypertension        Encounter for screening prostate specific antigen (PSA) measurement                     Julio C Granados DO

## 2025-03-17 ENCOUNTER — TELEPHONE (OUTPATIENT)
Dept: PRIMARY CARE | Facility: CLINIC | Age: 65
End: 2025-03-17
Payer: COMMERCIAL

## 2025-03-17 NOTE — TELEPHONE ENCOUNTER
Pt wife calling in  He had an appt 2/18  Still having pain on his side  And said you mentioned something about an xray?

## 2025-03-18 DIAGNOSIS — R10.9 ACUTE ABDOMINAL PAIN IN RIGHT FLANK: Primary | ICD-10-CM

## 2025-03-18 NOTE — TELEPHONE ENCOUNTER
Pt called back stating that pain is still on the right side as before, if anything he feels like it has gotten worse. He states he no longer has pain in his back. Please advise.

## 2025-03-28 ENCOUNTER — HOSPITAL ENCOUNTER (OUTPATIENT)
Dept: RADIOLOGY | Facility: CLINIC | Age: 65
Discharge: HOME | End: 2025-03-28
Payer: COMMERCIAL

## 2025-03-28 DIAGNOSIS — R10.9 ACUTE ABDOMINAL PAIN IN RIGHT FLANK: ICD-10-CM

## 2025-03-28 PROCEDURE — 74176 CT ABD & PELVIS W/O CONTRAST: CPT

## 2025-03-30 DIAGNOSIS — N40.1 BENIGN PROSTATIC HYPERPLASIA WITH URINARY RETENTION: Primary | ICD-10-CM

## 2025-03-30 DIAGNOSIS — R33.8 BENIGN PROSTATIC HYPERPLASIA WITH URINARY RETENTION: Primary | ICD-10-CM

## 2025-03-30 RX ORDER — TAMSULOSIN HYDROCHLORIDE 0.4 MG/1
0.4 CAPSULE ORAL DAILY
Qty: 30 CAPSULE | Refills: 5 | Status: SHIPPED | OUTPATIENT
Start: 2025-03-30 | End: 2025-09-26

## 2025-04-19 NOTE — PROGRESS NOTES
Subjective   Patient ID: Jacobo Dietz is a 64 y.o. male who presents for EVALUALTIN OF  RIGHT SIDED FLANK PAIN THAT BEGAN AFTER SLIPPING ON ICE IN FEB.  HPI:  Are you experiencing:  Burning on urination --NO  Pain on urination  --NO  Urinary frequency --NO  Urinary urgency -- NO  Urge incontinence --NO  Urinary stress incontinence  -- NO  Number of pads used per day --NONE  Enuresis -- OCC  Nocturia-- 2-3X ON AVG  Hematuria --NO  Hesitancy -- NO  Post void fullness -- NO  Strength of your stream-- GOOD    ROS:  General-- No C/O fever or chills  Head-- No C/O Dizziness  Eyes-- NO  C/O blurry or double vision  Ears-- No C/O hearing loss  Neck-- Supple  Chest-- No C/O pain or discomfort  Lungs-- No C/O shortness of breath  Abdomen-- No C/O  pain or discomfort, No nausea or vomiting  Back-- No C/O back pain or discomfort  Extremities-- No C/O swelling or pain    OBJECTIVE  General-- well-developed, well-nourished in NAD  Head-- normal cephalic, atraumatic  Eyes-- PERRL, EOM'S FROM, no jaundice  Neck-- Supple, without masses  Chest-- Normal bony structure  Abdomen-- soft, non tender, liver spleen not palpable. No suprapubic masses.  Back-- no flank masses palpable, no CVA tenderness on palpation or percussion  Lymph nodes-- No inguinal lymphadenopathy noted  Prostate--2+, firm, smooth, non-tender, without nodules  Testis-- both down, non-tender, without masses  Epididymis-- no masses palpable  Scrotum -- no hydrocele noted  Extremities -- Normal muscle mass and tone for the patients age  Neurological-- oriented times three    PSA:  5/23/2024--2.77  8/4/2022--2.50  2/4/2021--1.52  2/18/2019--1.81  4/12/2018--1.38    3-18-25  CAT SCAN OF THE ABD AND PELVIS:  IMPRESSION:  Markedly distended urinary bladder with mild right-sided  hydronephrosis. The findings are suspicious for bladder outlet  obstruction. If the patient is unable to void, consider placement of  Camacho catheter. Sequela of chronic bladder outlet  obstructing  including thickening of the walls of the urinary bladder, bladder  diverticula, and trabeculation. Prostatomegaly.  Hepatic steatosis.  Left pelvic kidney.    U/S PVR-- > 1000 ML  ST CATH PVR -- 61618 ML    URINALYSIS DIPSTICK-- 1+ GLUCOSE     # 16 FR HAY PLACED WITH MILD DIFFICULTY AND LEFT TO CD  ASSESSMENT / PLAN  A:  URINARY RETENTION -- PT PROBABLY HAS A SENSORY , MOTOR NEUROGENIC BLADDER SECONDARY TO LONGSTANDING OVERDISTENTION OF THE BLADDER   MILD RIGHT HYDRONEPHROSIS SECONDARY TO THE ABOVE -- SHOULD RESOLVE WITH HAY CATHETER DRAINAGE   BPH ON WAQAS    LEFT SIDED PELVIC KIDNEY  P:  HAY LEFT TO CD  F/U IN WEEK TO? TEACH THE PT HOW TO DO ISC  OK TO STOP THE FLOMAX   Med Cai MD 04/19/25 5:10 PM

## 2025-04-21 ENCOUNTER — APPOINTMENT (OUTPATIENT)
Age: 65
End: 2025-04-21
Payer: COMMERCIAL

## 2025-04-21 VITALS
TEMPERATURE: 97.8 F | SYSTOLIC BLOOD PRESSURE: 156 MMHG | HEART RATE: 81 BPM | BODY MASS INDEX: 26.71 KG/M2 | HEIGHT: 71 IN | DIASTOLIC BLOOD PRESSURE: 97 MMHG | WEIGHT: 190.8 LBS

## 2025-04-21 DIAGNOSIS — R33.9 URINARY RETENTION: ICD-10-CM

## 2025-04-21 DIAGNOSIS — R33.8 BENIGN PROSTATIC HYPERPLASIA WITH URINARY RETENTION: ICD-10-CM

## 2025-04-21 DIAGNOSIS — R35.1 NOCTURIA: Primary | ICD-10-CM

## 2025-04-21 DIAGNOSIS — N40.1 BENIGN PROSTATIC HYPERPLASIA WITH URINARY RETENTION: ICD-10-CM

## 2025-04-21 DIAGNOSIS — R10.9 FLANK PAIN: ICD-10-CM

## 2025-04-21 LAB
POC APPEARANCE, URINE: CLEAR
POC BILIRUBIN, URINE: NEGATIVE
POC BLOOD, URINE: NEGATIVE
POC COLOR, URINE: YELLOW
POC GLUCOSE, URINE: ABNORMAL MG/DL
POC KETONES, URINE: NEGATIVE MG/DL
POC LEUKOCYTES, URINE: NEGATIVE
POC NITRITE,URINE: NEGATIVE
POC PH, URINE: 6 PH
POC PROTEIN, URINE: NEGATIVE MG/DL
POC SPECIFIC GRAVITY, URINE: 1.01
POC UROBILINOGEN, URINE: 0.2 EU/DL

## 2025-04-21 PROCEDURE — 3008F BODY MASS INDEX DOCD: CPT | Performed by: UROLOGY

## 2025-04-21 PROCEDURE — 3080F DIAST BP >= 90 MM HG: CPT | Performed by: UROLOGY

## 2025-04-21 PROCEDURE — 51798 US URINE CAPACITY MEASURE: CPT | Performed by: UROLOGY

## 2025-04-21 PROCEDURE — 3077F SYST BP >= 140 MM HG: CPT | Performed by: UROLOGY

## 2025-04-21 PROCEDURE — 81003 URINALYSIS AUTO W/O SCOPE: CPT | Performed by: UROLOGY

## 2025-04-21 PROCEDURE — 1036F TOBACCO NON-USER: CPT | Performed by: UROLOGY

## 2025-04-21 PROCEDURE — 99204 OFFICE O/P NEW MOD 45 MIN: CPT | Performed by: UROLOGY

## 2025-04-21 PROCEDURE — 51702 INSERT TEMP BLADDER CATH: CPT | Performed by: UROLOGY

## 2025-04-21 RX ORDER — LIDOCAINE HYDROCHLORIDE 20 MG/ML
1 JELLY TOPICAL ONCE
Status: COMPLETED | OUTPATIENT
Start: 2025-04-21 | End: 2025-04-21

## 2025-04-21 RX ADMIN — LIDOCAINE HYDROCHLORIDE 1 APPLICATION: 20 JELLY TOPICAL at 15:37

## 2025-04-21 NOTE — LETTER
April 25, 2025     Julio C Granados DO  [address] [facility] [address]    Patient: Jacobo Dietz   YOB: 1960   Date of Visit: 4/21/2025       Dear Dr. Julio C Granados DO:    Thank you for referring Jacobo Dietz to me for evaluation. Below are my notes for this consultation.  If you have questions, please do not hesitate to call me. I look forward to following your patient along with you.       Sincerely,     Med Cai MD      CC: No Recipients  ______________________________________________________________________________________    Subjective  Patient ID: Jacobo Dietz is a 64 y.o. male who presents for EVALUALTIN OF  RIGHT SIDED FLANK PAIN THAT BEGAN AFTER SLIPPING ON ICE IN FEB.  HPI:  Are you experiencing:  Burning on urination --NO  Pain on urination  --NO  Urinary frequency --NO  Urinary urgency -- NO  Urge incontinence --NO  Urinary stress incontinence  -- NO  Number of pads used per day --NONE  Enuresis -- OCC  Nocturia-- 2-3X ON AVG  Hematuria --NO  Hesitancy -- NO  Post void fullness -- NO  Strength of your stream-- GOOD    ROS:  General-- No C/O fever or chills  Head-- No C/O Dizziness  Eyes-- NO  C/O blurry or double vision  Ears-- No C/O hearing loss  Neck-- Supple  Chest-- No C/O pain or discomfort  Lungs-- No C/O shortness of breath  Abdomen-- No C/O  pain or discomfort, No nausea or vomiting  Back-- No C/O back pain or discomfort  Extremities-- No C/O swelling or pain    OBJECTIVE  General-- well-developed, well-nourished in NAD  Head-- normal cephalic, atraumatic  Eyes-- PERRL, EOM'S FROM, no jaundice  Neck-- Supple, without masses  Chest-- Normal bony structure  Abdomen-- soft, non tender, liver spleen not palpable. No suprapubic masses.  Back-- no flank masses palpable, no CVA tenderness on palpation or percussion  Lymph nodes-- No inguinal lymphadenopathy noted  Prostate--2+, firm, smooth, non-tender, without nodules  Testis-- both down, non-tender, without  masses  Epididymis-- no masses palpable  Scrotum -- no hydrocele noted  Extremities -- Normal muscle mass and tone for the patients age  Neurological-- oriented times three    PSA:  5/23/2024--2.77  8/4/2022--2.50  2/4/2021--1.52  2/18/2019--1.81  4/12/2018--1.38    3-18-25  CAT SCAN OF THE ABD AND PELVIS:  IMPRESSION:  Markedly distended urinary bladder with mild right-sided  hydronephrosis. The findings are suspicious for bladder outlet  obstruction. If the patient is unable to void, consider placement of  Hay catheter. Sequela of chronic bladder outlet obstructing  including thickening of the walls of the urinary bladder, bladder  diverticula, and trabeculation. Prostatomegaly.  Hepatic steatosis.  Left pelvic kidney.    U/S PVR-- > 1000 ML  ST CATH PVR -- 86083 ML    URINALYSIS DIPSTICK-- 1+ GLUCOSE     # 16 FR HAY PLACED WITH MILD DIFFICULTY AND LEFT TO CD  ASSESSMENT / PLAN  A:  URINARY RETENTION -- PT PROBABLY HAS A SENSORY , MOTOR NEUROGENIC BLADDER SECONDARY TO LONGSTANDING OVERDISTENTION OF THE BLADDER   MILD RIGHT HYDRONEPHROSIS SECONDARY TO THE ABOVE -- SHOULD RESOLVE WITH HAY CATHETER DRAINAGE   BPH ON WAQAS    LEFT SIDED PELVIC KIDNEY  P:  HAY LEFT TO CD  F/U IN WEEK TO? TEACH THE PT HOW TO DO ISC  OK TO STOP THE FLOMAX   Med Cai MD 04/19/25 5:10 PM

## 2025-04-26 NOTE — PROGRESS NOTES
Subjective   Patient ID: Jacobo Dietz is a 64 y.o. male who presents for A F/U ON HIS H/O URINARY RETENTION.  PT HAD A HAY PLACED ON 4-19-25 FOR A PVR OF 2000 ML.      ASSESSMENT / PLAN  A:  URINARY RETENTION -- PT PROBABLY HAS A SENSORY , MOTOR NEUROGENIC BLADDER SECONDARY TO LONGSTANDING OVERDISTENTION OF THE BLADDER   MILD RIGHT HYDRONEPHROSIS SECONDARY TO THE ABOVE -- SHOULD RESOLVE WITH HAY CATHETER DRAINAGE   BPH ON WAQAS     LEFT SIDED PELVIC KIDNEY  P:  HAY LEFT TO CD  F/U IN WEEK TO? TEACH THE PT HOW TO DO ISC  OK TO STOP THE FLOMAX   Med Cai MD 04/26/25 1:23 PM

## 2025-04-28 ENCOUNTER — APPOINTMENT (OUTPATIENT)
Age: 65
End: 2025-04-28
Payer: COMMERCIAL

## 2025-04-28 VITALS
HEART RATE: 77 BPM | WEIGHT: 187 LBS | BODY MASS INDEX: 26.08 KG/M2 | SYSTOLIC BLOOD PRESSURE: 160 MMHG | TEMPERATURE: 98.8 F | DIASTOLIC BLOOD PRESSURE: 82 MMHG

## 2025-04-28 DIAGNOSIS — R33.9 URINARY RETENTION: Primary | ICD-10-CM

## 2025-04-28 DIAGNOSIS — N31.2 ATONIC NEUROGENIC BLADDER: ICD-10-CM

## 2025-04-28 PROCEDURE — 99212 OFFICE O/P EST SF 10 MIN: CPT | Performed by: UROLOGY

## 2025-04-28 PROCEDURE — 3079F DIAST BP 80-89 MM HG: CPT | Performed by: UROLOGY

## 2025-04-28 PROCEDURE — 1036F TOBACCO NON-USER: CPT | Performed by: UROLOGY

## 2025-04-28 PROCEDURE — 3077F SYST BP >= 140 MM HG: CPT | Performed by: UROLOGY

## 2025-04-28 NOTE — LETTER
April 29, 2025     Julio C Granados DO  [address] [facility] [address]    Patient: Jacobo Dietz   YOB: 1960   Date of Visit: 4/28/2025       Dear Dr. Julio C Granados DO:    Thank you for referring Jacobo Dietz to me for evaluation. Below are my notes for this consultation.  If you have questions, please do not hesitate to call me. I look forward to following your patient along with you.       Sincerely,     Med Cai MD      CC: No Recipients  ______________________________________________________________________________________    Subjective  Patient ID: Jacobo Dietz is a 64 y.o. male who presents for A F/U ON HIS H/O URINARY RETENTION.  PT HAD A HAY PLACED ON 4-19-25 FOR A PVR OF 2000 ML.      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

## 2025-05-03 NOTE — PROGRESS NOTES
Subjective   Patient ID: Jacobo Dietz is a 64 y.o. male who presents for A F/U AFTER LEARNING ISC ON 4-28-25.   PT WAS FOUND TO HAVE A PVR OF 19516  ML ON  4-19-25.   PT IS CATHING  X  / DAY.      ASSESSMENT / PLAN  A:  URINARY RETENTION -- PT PROBABLY HAS A SENSORY , MOTOR NEUROGENIC BLADDER SECONDARY TO LONGSTANDING OVERDISTENTION OF THE BLADDER   MILD RIGHT HYDRONEPHROSIS SECONDARY TO THE ABOVE -- SHOULD RESOLVE WITH HAY CATHETER DRAINAGE   BPH ON WAQAS  PT IS DOING ISC X / DAY     LEFT SIDED PELVIC KIDNEY  P:  CONTINUE:  ISC    F/U IN 6 MONTHS   Med Cai MD 05/03/25 3:42 PM

## 2025-05-05 ENCOUNTER — APPOINTMENT (OUTPATIENT)
Age: 65
End: 2025-05-05
Payer: COMMERCIAL

## 2025-05-05 VITALS — DIASTOLIC BLOOD PRESSURE: 75 MMHG | HEART RATE: 76 BPM | SYSTOLIC BLOOD PRESSURE: 131 MMHG

## 2025-05-05 DIAGNOSIS — N31.2 ATONIC NEUROGENIC BLADDER: ICD-10-CM

## 2025-05-05 PROCEDURE — 3075F SYST BP GE 130 - 139MM HG: CPT | Performed by: UROLOGY

## 2025-05-05 PROCEDURE — 99213 OFFICE O/P EST LOW 20 MIN: CPT | Performed by: UROLOGY

## 2025-05-05 PROCEDURE — 3078F DIAST BP <80 MM HG: CPT | Performed by: UROLOGY

## 2025-05-05 NOTE — LETTER
May 6, 2025     Julio C Granados DO  [address] [facility] [address]    Patient: Jacobo Dietz   YOB: 1960   Date of Visit: 5/5/2025       Dear Dr. Julio C Granados DO:    Thank you for referring Jacobo Dietz to me for evaluation. Below are my notes for this consultation.  If you have questions, please do not hesitate to call me. I look forward to following your patient along with you.       Sincerely,     Med Cai MD      CC: No Recipients  ______________________________________________________________________________________    Subjective  Patient ID: Jacobo Dietz is a 64 y.o. male who presents for A F/U AFTER LEARNING ISC ON 4-28-25.   PT WAS FOUND TO HAVE A PVR OF 57705  ML ON  4-19-25.   PT IS CATHING 5  X  / DAY.   PVR'S RANGE FROM 275 - 850 ML-- NO BLADDER PAIN  EXPERIENCED AT  ML LEVEL    ASSESSMENT / PLAN  A:  URINARY RETENTION -- PT PROBABLY HAS A SENSORY , MOTOR NEUROGENIC BLADDER SECONDARY TO LONGSTANDING OVER DISTENTION OF THE BLADDER .  MILD RIGHT HYDRONEPHROSIS SECONDARY TO THE ABOVE -- SHOULD RESOLVE NOW THAT THE BLADDER IS DECOMPRESSED  BPH ON WAQAS  PT IS DOING ISC 5  X / DAY WITHOUT DIFFICULTY   PATHOPHYSIOLOGY OF THE ABOVE RE-DISCUSSED AGAIN IN DETAIL WITH THE PT AND HIS WIFE  ALL QUESTIONS ANSWERED      LEFT SIDED PELVIC KIDNEY  P:  CONTINUE:  ISC   WITH A STRAIGHT TIP CATHETER 5 X / DAY-- THIS IS A CHRONIC CONDITION  F/U IN 6 MONTHS   Med Cai MD 05/03/25 3:42 PM

## 2025-05-05 NOTE — LETTER
May 5, 2025     Patient: Jacobo Dietz   YOB: 1960   Date of Visit: 4/21/2025       To Whom It May Concern:    Jacobo Dietz was seen in my clinic on 4/21/2025. Please excuse Jacobo for his absence from work on this day to make the appointment.    If you have any questions or concerns, please don't hesitate to call.         Sincerely,         Med Cai MD  [phone]      CC: No Recipients

## 2025-05-07 ENCOUNTER — OFFICE VISIT (OUTPATIENT)
Dept: PRIMARY CARE | Facility: CLINIC | Age: 65
End: 2025-05-07
Payer: COMMERCIAL

## 2025-05-07 VITALS
BODY MASS INDEX: 25.9 KG/M2 | DIASTOLIC BLOOD PRESSURE: 80 MMHG | SYSTOLIC BLOOD PRESSURE: 130 MMHG | WEIGHT: 185 LBS | HEART RATE: 73 BPM | HEIGHT: 71 IN | OXYGEN SATURATION: 98 % | RESPIRATION RATE: 16 BRPM | TEMPERATURE: 97.9 F

## 2025-05-07 DIAGNOSIS — Z12.5 ENCOUNTER FOR SCREENING PROSTATE SPECIFIC ANTIGEN (PSA) MEASUREMENT: ICD-10-CM

## 2025-05-07 DIAGNOSIS — R73.03 PREDIABETES: ICD-10-CM

## 2025-05-07 DIAGNOSIS — N31.9 BLADDER DYSFUNCTION: ICD-10-CM

## 2025-05-07 DIAGNOSIS — N31.2 ATONIC NEUROGENIC BLADDER: ICD-10-CM

## 2025-05-07 DIAGNOSIS — Z00.00 PHYSICAL EXAM, ANNUAL: Primary | ICD-10-CM

## 2025-05-07 DIAGNOSIS — E55.9 VITAMIN D DEFICIENCY: ICD-10-CM

## 2025-05-07 DIAGNOSIS — E11.9 TYPE 2 DIABETES MELLITUS WITHOUT COMPLICATION, WITHOUT LONG-TERM CURRENT USE OF INSULIN: ICD-10-CM

## 2025-05-07 DIAGNOSIS — R73.9 HYPERGLYCEMIA: ICD-10-CM

## 2025-05-07 LAB
HBA1C MFR BLD: 6.5 % (ref 4.2–6.5)
POC FINGERSTICK BLOOD GLUCOSE: 100 MG/DL (ref 70–100)

## 2025-05-07 PROCEDURE — 83036 HEMOGLOBIN GLYCOSYLATED A1C: CPT | Mod: CLIA WAIVED TEST | Performed by: FAMILY MEDICINE

## 2025-05-07 PROCEDURE — 3075F SYST BP GE 130 - 139MM HG: CPT | Performed by: FAMILY MEDICINE

## 2025-05-07 PROCEDURE — 99396 PREV VISIT EST AGE 40-64: CPT | Performed by: FAMILY MEDICINE

## 2025-05-07 PROCEDURE — 93000 ELECTROCARDIOGRAM COMPLETE: CPT | Performed by: FAMILY MEDICINE

## 2025-05-07 PROCEDURE — 3079F DIAST BP 80-89 MM HG: CPT | Performed by: FAMILY MEDICINE

## 2025-05-07 PROCEDURE — 3044F HG A1C LEVEL LT 7.0%: CPT | Performed by: FAMILY MEDICINE

## 2025-05-07 PROCEDURE — 3008F BODY MASS INDEX DOCD: CPT | Performed by: FAMILY MEDICINE

## 2025-05-07 PROCEDURE — 1036F TOBACCO NON-USER: CPT | Performed by: FAMILY MEDICINE

## 2025-05-07 PROCEDURE — 82962 GLUCOSE BLOOD TEST: CPT | Performed by: FAMILY MEDICINE

## 2025-05-07 ASSESSMENT — PATIENT HEALTH QUESTIONNAIRE - PHQ9
2. FEELING DOWN, DEPRESSED OR HOPELESS: NOT AT ALL
SUM OF ALL RESPONSES TO PHQ9 QUESTIONS 1 AND 2: 0
1. LITTLE INTEREST OR PLEASURE IN DOING THINGS: NOT AT ALL

## 2025-05-07 ASSESSMENT — PAIN SCALES - GENERAL: PAINLEVEL_OUTOF10: 0-NO PAIN

## 2025-05-07 NOTE — PROGRESS NOTES
Subjective   Jacobo Dietz is a 64 y.o. male who presents for Annual Exam (Patient here for annual physical exam today).    HPI  : Patient is a 64-year-old male who is in today for his annual physical exam.  He will have complete blood work, an ECG and a physical exam.  Over the past month he has been dealing with atonic neurogenic bladder and has been following with urology and he is now doing self-catheterization at home approximately 4 times daily.  He has a follow-up appointment with urology but they are allowing it time with the self-catheterization so the bladder can shrink back down to normal.  Fortunately has not had any renal damage and no indications of prostate or bladder cancer.  Today after long conversation he is happy that he found out what the problem was since it seems like it had been going on for several months where he had difficulty urinating.  Patient is still able to go to work and function on a normal basis with his job and he just has to do the self-catheterization as directed by urology.    Objective  : ROS : 10 systems were reviewed and the information is included in the HPI and no additional review of systems is indicated.    Physical Exam  Vitals and nursing note reviewed.   Constitutional:       Appearance: Normal appearance.      Comments: Patient is alert and oriented x3.   No acute distress   HENT:      Head: Normocephalic.      Right Ear: Tympanic membrane and external ear normal.      Left Ear: Tympanic membrane and external ear normal.      Ears:      Comments: Ears are patent bilaterally and TMs are clear.     Nose: Nose normal.      Mouth/Throat:      Mouth: Mucous membranes are moist.      Pharynx: Oropharynx is clear.      Comments: Mouth is moist, tongue is midline.  No posterior pharyngeal erythema.  Eyes:      Extraocular Movements: Extraocular movements intact.      Conjunctiva/sclera: Conjunctivae normal.      Pupils: Pupils are equal, round, and reactive to light.       Comments: No visual disturbance, does have his  eyes examined once a year.   Neck:      Comments: No carotid bruits, no thyromegaly, no cervical adenopathy.  Occasional neck spasm and restriction of motion secondary to stress and tension.  Cardiovascular:      Rate and Rhythm: Normal rate and regular rhythm.      Pulses: Normal pulses.      Heart sounds: Normal heart sounds.      Comments: Patient denies chest pain and no palpitations.  Heart rhythm is stable S1 and S2 are noted, no ectopics.  Pulmonary:      Effort: Pulmonary effort is normal.      Comments: Patient denies any coughing or wheezing.  Lungs are clear to auscultation.    Abdominal:      General: Bowel sounds are normal.      Palpations: Abdomen is soft.      Comments:  Abdomen is soft and previous distention has improved with the use of the self-catheterization and he no longer has the distended bladder.  No longer has any flank tenderness.   No suprapubic pain.  Positive bowel sounds x4.  No abdominal guarding and no rebound tenderness.  Patient did have his colonoscopy 3 years ago and is stable.  09/20/2022   Genitourinary:     Comments: BPH  and Urinary retention.   Doing self catheterazation  4 x a day.  Follows with urology now on a regular basis.  He has an atonic neurogenic bladder.  Musculoskeletal:         General: Normal range of motion.      Cervical back: Normal range of motion.      Comments: Age-related arthritis in the joints.  Mild restriction of motion cervical and lumbar spines due to muscle spasm.   Skin:     General: Skin is warm.      Comments: There is no bruising, no erythema, no skin lesions noted, no rashes.   Neurological:      General: No focal deficit present.      Mental Status: He is alert and oriented to person, place, and time. Mental status is at baseline.      Comments: Patient is dealing with an atonic neurogenic bladder and is following with urology.  No focal neurosensory deficits are noted.  Patient denies any  peripheral neuropathy.  Coordination and gait are stable.  Normal muscle strength upper and lower extremities.   Psychiatric:         Mood and Affect: Mood normal.         Behavior: Behavior normal.         Thought Content: Thought content normal.         Judgment: Judgment normal.      Comments: Patient has normal mood and affect.  Thought content and judgment are stable.  No signs of vascular dementia.  Behavior is normal.     PLAN : Patient is a 64-year-old male who was evaluated today for a physical exam.  He is unable to give us a urine specimen since he has to do self-catheterization due to his neurogenic atonic bladder.  Today's ECG shows normal sinus rhythm at a rate of 75 bpm, no acute ST-T wave changes are noted, normal ECG.  He did have complete blood work drawn and will be notified of those results in 3 days.  His physical exam is very stable except that he has to do the urinary bladder self catheterization at least 4 times daily.  He is following on a regular basis with urology and they are attempting to allow the bladder to shrink back to normal size and then determine what the next procedure will be.  Overall patient is doing very well and able to go to work and perform the self-catheterization on his own.  There has been no kidney damage and he will be notified of all the blood results in 3 days.  Today's blood sugar was 100 and his A1c was 6.5% and he will try to watch his diet and avoid the sweets.  He will follow-up as needed pending the blood work results.    Problem List Items Addressed This Visit       Vitamin D deficiency    Relevant Orders    Vitamin D 25-Hydroxy,Total (for eval of Vitamin D levels)    Type 2 diabetes mellitus without complication    Relevant Orders    POCT Glycosylated Hemoglobin (HGB A1C) docked device    POCT fingerstick glucose manually resulted    Physical exam, annual    Relevant Orders    Comprehensive Metabolic Panel    Lipid Panel    CBC    Thyroid Stimulating  Hormone    ECG 12 Lead     Other Visit Diagnoses         Encounter for screening prostate specific antigen (PSA) measurement        Relevant Orders    Prostate Specific Antigen, Screen                 Julio C Granados DO

## 2025-05-08 DIAGNOSIS — E55.9 VITAMIN D DEFICIENCY: Primary | ICD-10-CM

## 2025-05-08 LAB
25(OH)D3+25(OH)D2 SERPL-MCNC: 22 NG/ML (ref 30–100)
ALBUMIN SERPL-MCNC: 4.8 G/DL (ref 3.6–5.1)
ALP SERPL-CCNC: 102 U/L (ref 35–144)
ALT SERPL-CCNC: 12 U/L (ref 9–46)
ANION GAP SERPL CALCULATED.4IONS-SCNC: 11 MMOL/L (CALC) (ref 7–17)
AST SERPL-CCNC: 20 U/L (ref 10–35)
BILIRUB SERPL-MCNC: 0.8 MG/DL (ref 0.2–1.2)
BUN SERPL-MCNC: 12 MG/DL (ref 7–25)
CALCIUM SERPL-MCNC: 9.5 MG/DL (ref 8.6–10.3)
CHLORIDE SERPL-SCNC: 103 MMOL/L (ref 98–110)
CHOLEST SERPL-MCNC: 189 MG/DL
CHOLEST/HDLC SERPL: 3.8 (CALC)
CO2 SERPL-SCNC: 24 MMOL/L (ref 20–32)
CREAT SERPL-MCNC: 0.87 MG/DL (ref 0.7–1.35)
EGFRCR SERPLBLD CKD-EPI 2021: 97 ML/MIN/1.73M2
ERYTHROCYTE [DISTWIDTH] IN BLOOD BY AUTOMATED COUNT: 12 % (ref 11–15)
GLUCOSE SERPL-MCNC: 110 MG/DL (ref 65–99)
HCT VFR BLD AUTO: 46.6 % (ref 38.5–50)
HDLC SERPL-MCNC: 50 MG/DL
HGB BLD-MCNC: 15.8 G/DL (ref 13.2–17.1)
LDLC SERPL CALC-MCNC: 115 MG/DL (CALC)
MCH RBC QN AUTO: 30.2 PG (ref 27–33)
MCHC RBC AUTO-ENTMCNC: 33.9 G/DL (ref 32–36)
MCV RBC AUTO: 89.1 FL (ref 80–100)
NONHDLC SERPL-MCNC: 139 MG/DL (CALC)
PLATELET # BLD AUTO: 297 THOUSAND/UL (ref 140–400)
PMV BLD REES-ECKER: 9.8 FL (ref 7.5–12.5)
POTASSIUM SERPL-SCNC: 4.4 MMOL/L (ref 3.5–5.3)
PROT SERPL-MCNC: 7.5 G/DL (ref 6.1–8.1)
PSA SERPL-MCNC: 6.56 NG/ML
RBC # BLD AUTO: 5.23 MILLION/UL (ref 4.2–5.8)
SODIUM SERPL-SCNC: 138 MMOL/L (ref 135–146)
TRIGL SERPL-MCNC: 125 MG/DL
TSH SERPL-ACNC: 1.07 MIU/L (ref 0.4–4.5)
WBC # BLD AUTO: 7.5 THOUSAND/UL (ref 3.8–10.8)

## 2025-05-08 RX ORDER — ERGOCALCIFEROL 1.25 MG/1
1.25 CAPSULE ORAL WEEKLY
Qty: 4 CAPSULE | Refills: 1 | Status: SHIPPED | OUTPATIENT
Start: 2025-05-08 | End: 2025-07-03

## 2025-05-08 NOTE — RESULT ENCOUNTER NOTE
The PSA is 6.56      and should be under 4.0    that is probably from the enlarged prostate      and he can discuss that with Dr. Cai his urologist.      Vitamin D is low at 22 and should be above 30 I will send in some prescription vitamin D      to take once per week        and he should take over-the-counter vitamin D 3000 units daily kidney and liver function were normal      blood sugar was borderline at 110 but not bad      the diabetes A1c was prediabetic at 6.5   and could be down around 6.0 which would be better.   Cholesterol was normal at 189        triglycerides are normal at 125       Red and white blood cell counts are normal      platelet count is normal   thyroid function is normal         watch the sugar and the carbohydrates.         Discuss the PSA with the urologist.      Take extra vitamin D.   And follow-up in 4 to 6 months.  I did send prescription vitamin D to the pharmacy   to take once per week

## 2025-06-28 DIAGNOSIS — E55.9 VITAMIN D DEFICIENCY: ICD-10-CM

## 2025-06-30 RX ORDER — ERGOCALCIFEROL 1.25 MG/1
1.25 CAPSULE ORAL
Qty: 4 CAPSULE | Refills: 1 | Status: SHIPPED | OUTPATIENT
Start: 2025-07-06

## 2025-11-05 ENCOUNTER — APPOINTMENT (OUTPATIENT)
Age: 65
End: 2025-11-05
Payer: COMMERCIAL